# Patient Record
Sex: MALE | Race: BLACK OR AFRICAN AMERICAN | HISPANIC OR LATINO
[De-identification: names, ages, dates, MRNs, and addresses within clinical notes are randomized per-mention and may not be internally consistent; named-entity substitution may affect disease eponyms.]

---

## 2021-07-28 ENCOUNTER — APPOINTMENT (OUTPATIENT)
Dept: CT IMAGING | Facility: CLINIC | Age: 51
End: 2021-07-28
Payer: MEDICAID

## 2021-07-28 ENCOUNTER — OUTPATIENT (OUTPATIENT)
Dept: OUTPATIENT SERVICES | Facility: HOSPITAL | Age: 51
LOS: 1 days | End: 2021-07-28

## 2021-07-28 PROCEDURE — 75574 CT ANGIO HRT W/3D IMAGE: CPT | Mod: 26

## 2021-09-08 VITALS
RESPIRATION RATE: 20 BRPM | WEIGHT: 222.01 LBS | SYSTOLIC BLOOD PRESSURE: 96 MMHG | HEIGHT: 68 IN | DIASTOLIC BLOOD PRESSURE: 60 MMHG | HEART RATE: 63 BPM | OXYGEN SATURATION: 94 % | TEMPERATURE: 98 F

## 2021-09-08 RX ORDER — CHLORHEXIDINE GLUCONATE 213 G/1000ML
1 SOLUTION TOPICAL ONCE
Refills: 0 | Status: DISCONTINUED | OUTPATIENT
Start: 2021-09-10 | End: 2021-09-25

## 2021-09-08 NOTE — H&P ADULT - HISTORY OF PRESENT ILLNESS
[facility] CARDIOLOGY [facility]    Cardiologist: Dr. Maria D Chin   Pharmacy: [facility]  Escort:   COVID: (   )  on           , results:    *Confirm medications*    Pt is a 51 y/o M, with FHx of heart disease (father with CABG at 89), and  PMHx of AME, aortic stenosis, carotid bruit, intermittent asthma (hospitalized in past, denies intubations), who presented to his cardiologist, Dr. Maria D Chin with complaints of shortness of breath with exertion <6 blocks while walking, running, or riding a bike. Patient describes his SOB as if he is "suffocating." Patient audibly wheezing on exam in office. Patient denies chest pain, palpitations, dizziness, syncope, orthopnea, PND, LE Edema, peripheral edema, fever, chills, recent travel, or sick contacts. ECHO 07/17/2021: LA/RA is normal. Aortic valve sclerosis with mild calcification. Trace MR/TR. No pericardial effusion. EF: 70%. CCTA 08/09/2021: Calcium score is mild at 27 Agatson units, which is at the 78 percentile, adjusted for age, gender, and race. Less than 25% stenosis of the ostium of the LM due to calcific plaque. Normal LAD, LCx, and RCA. EF: 57%. Heart is enlarged. Aortic annular/valve calcifications. Trace calcific plaque aortic arch.     In light of patient's risk factors, CCS class III angina equivalent symptoms, and abnormal CCTA pt to undergo cardiac catheterization with possible intervention if clinically indicated.  COVID: negative 9/7 (in chart)  Pharmacy: [facility]    50Y M w/ FHx CABG and PMHx of AME, aortic stenosis, carotid bruit and asthma, initially presented to outpt cardiologist, Dr. Chin c/o STUBBS w/ walking 6 blocks, running or riding a bike. He denies any CP, palpitations, dizziness, syncope, diaphoresis, fatigue, LE edema, orthopnea, PND, N/V, fever, chills or recent sick contact. Echo 7/17/21: EF 70%, trace MR/TR. CCTA 8/9/21: Calcium score 27, <25% stenosis ostial LM due to calcific plaque, Normal LAD, LCx, and RCA, EF 57%, Heart is enlarged, trace calcific plaque aortic arch.   In light of pts risk factors, CCS class III anginal symptoms and abnormal CCTA, pt now presents to [facility] for recommended cardiac catheterization with possible intervention if clinically indicated.  COVID: negative 9/7 (in chart)  Pharmacy: [email or web address]  Escort: Family    50Y M w/ FHx CAD w/ CABG and PMHx of AME, carotid bruit and asthma, initially presented to outpt cardiologist, Dr. Chin c/o TSUBBS w/ walking 6 blocks, running or riding a bike. He denies any CP, palpitations, dizziness, syncope, diaphoresis, fatigue, LE edema, orthopnea, PND, N/V, fever, chills or recent sick contact. Echo 7/17/21: EF 70%, trace MR/TR. CCTA 8/9/21: Calcium score 27, <25% stenosis ostial LM due to calcific plaque, Normal LAD, LCx, and RCA, EF 57%, Heart is enlarged, trace calcific plaque aortic arch.   In light of pts risk factors, CCS class III anginal symptoms and abnormal CCTA, pt now presents to [facility] for recommended cardiac catheterization with possible intervention if clinically indicated.

## 2021-09-08 NOTE — H&P ADULT - NSHPLABSRESULTS_GEN_ALL_CORE
16.1   8.01  )-----------( 183      ( 10 Sep 2021 14:40 )             48.0     139  |  105  |  15  ----------------------------<  83  4.0   |  x   |  0.92    Ca    9.0      10 Sep 2021 14:40      PT/INR - ( 10 Sep 2021 14:40 )   PT: 12.1 sec;   INR: 1.01          PTT - ( 10 Sep 2021 14:40 )  PTT:28.4 sec

## 2021-09-08 NOTE — H&P ADULT - ASSESSMENT
50Y M w/ FHx CABG and PMHx of AME, aortic stenosis, carotid bruit and asthma, 50Y M, Pashto speaking, w/ FHx CAD w/ CABG and PMHx of AME, carotid bruit and asthma, presents for recommended cardiac catheterization w/ possible intervention if clinically indicated, in light of pts risk factors, CCS class III anginal symptoms and abnormal CCTA.    -ASA 3, Mallampati 3  -Pt compliant w/ ASA 81mg, last dose yesterday. Will give home dose of ASA 81mg and load w/ PLavix 600mg prior to cath  -BP 96/60, pt euvolemic w/ normal LVEF, will give 250cc NS Bolus prior to cath  -pre cath consented w/  language line  #[id number]    Risks & benefits of procedure and alternative therapy have been explained to the patient including but not limited to: allergic reaction, bleeding w/possible need for blood transfusion, infection, renal and vascular compromise, limb damage, arrhythmia, stroke, vessel dissection/perforation, Myocardial infarction, emergent CABG. Informed consent obtained and in chart.

## 2021-09-08 NOTE — H&P ADULT - NSICDXFAMILYHX_GEN_ALL_CORE_FT
FAMILY HISTORY:  Father  Still living? Unknown  Family history of CABG, Age at diagnosis: Age Unknown

## 2021-09-10 ENCOUNTER — OUTPATIENT (OUTPATIENT)
Dept: OUTPATIENT SERVICES | Facility: HOSPITAL | Age: 51
LOS: 1 days | Discharge: HOME | End: 2021-09-10
Payer: MEDICAID

## 2021-09-10 LAB
A1C WITH ESTIMATED AVERAGE GLUCOSE RESULT: 6 % — HIGH (ref 4–5.6)
ALBUMIN SERPL ELPH-MCNC: 4.3 G/DL — SIGNIFICANT CHANGE UP (ref 3.3–5)
ALP SERPL-CCNC: 55 U/L — SIGNIFICANT CHANGE UP (ref 40–120)
ALT FLD-CCNC: 24 U/L — SIGNIFICANT CHANGE UP (ref 10–45)
ANION GAP SERPL CALC-SCNC: 13 MMOL/L — SIGNIFICANT CHANGE UP (ref 5–17)
APTT BLD: 28.4 SEC — SIGNIFICANT CHANGE UP (ref 27.5–35.5)
AST SERPL-CCNC: 20 U/L — SIGNIFICANT CHANGE UP (ref 10–40)
BASOPHILS # BLD AUTO: 0.05 K/UL — SIGNIFICANT CHANGE UP (ref 0–0.2)
BASOPHILS NFR BLD AUTO: 0.6 % — SIGNIFICANT CHANGE UP (ref 0–2)
BILIRUB SERPL-MCNC: 0.4 MG/DL — SIGNIFICANT CHANGE UP (ref 0.2–1.2)
BUN SERPL-MCNC: 15 MG/DL — SIGNIFICANT CHANGE UP (ref 7–23)
CALCIUM SERPL-MCNC: 9 MG/DL — SIGNIFICANT CHANGE UP (ref 8.4–10.5)
CHLORIDE SERPL-SCNC: 105 MMOL/L — SIGNIFICANT CHANGE UP (ref 96–108)
CHOLEST SERPL-MCNC: 178 MG/DL — SIGNIFICANT CHANGE UP
CK MB CFR SERPL CALC: 1.4 NG/ML — SIGNIFICANT CHANGE UP (ref 0–6.7)
CK SERPL-CCNC: 61 U/L — SIGNIFICANT CHANGE UP (ref 30–200)
CO2 SERPL-SCNC: 21 MMOL/L — LOW (ref 22–31)
CREAT SERPL-MCNC: 0.92 MG/DL — SIGNIFICANT CHANGE UP (ref 0.5–1.3)
EOSINOPHIL # BLD AUTO: 0.46 K/UL — SIGNIFICANT CHANGE UP (ref 0–0.5)
EOSINOPHIL NFR BLD AUTO: 5.7 % — SIGNIFICANT CHANGE UP (ref 0–6)
ESTIMATED AVERAGE GLUCOSE: 126 MG/DL — HIGH (ref 68–114)
GLUCOSE SERPL-MCNC: 83 MG/DL — SIGNIFICANT CHANGE UP (ref 70–99)
HCT VFR BLD CALC: 48 % — SIGNIFICANT CHANGE UP (ref 39–50)
HDLC SERPL-MCNC: 50 MG/DL — SIGNIFICANT CHANGE UP
HGB BLD-MCNC: 16.1 G/DL — SIGNIFICANT CHANGE UP (ref 13–17)
IMM GRANULOCYTES NFR BLD AUTO: 0.2 % — SIGNIFICANT CHANGE UP (ref 0–1.5)
INR BLD: 1.01 — SIGNIFICANT CHANGE UP (ref 0.88–1.16)
LIPID PNL WITH DIRECT LDL SERPL: 111 MG/DL — HIGH
LYMPHOCYTES # BLD AUTO: 2.92 K/UL — SIGNIFICANT CHANGE UP (ref 1–3.3)
LYMPHOCYTES # BLD AUTO: 36.5 % — SIGNIFICANT CHANGE UP (ref 13–44)
MCHC RBC-ENTMCNC: 30.6 PG — SIGNIFICANT CHANGE UP (ref 27–34)
MCHC RBC-ENTMCNC: 33.5 GM/DL — SIGNIFICANT CHANGE UP (ref 32–36)
MCV RBC AUTO: 91.1 FL — SIGNIFICANT CHANGE UP (ref 80–100)
MONOCYTES # BLD AUTO: 0.96 K/UL — HIGH (ref 0–0.9)
MONOCYTES NFR BLD AUTO: 12 % — SIGNIFICANT CHANGE UP (ref 2–14)
NEUTROPHILS # BLD AUTO: 3.6 K/UL — SIGNIFICANT CHANGE UP (ref 1.8–7.4)
NEUTROPHILS NFR BLD AUTO: 45 % — SIGNIFICANT CHANGE UP (ref 43–77)
NON HDL CHOLESTEROL: 128 MG/DL — SIGNIFICANT CHANGE UP
NRBC # BLD: 0 /100 WBCS — SIGNIFICANT CHANGE UP (ref 0–0)
PLATELET # BLD AUTO: 183 K/UL — SIGNIFICANT CHANGE UP (ref 150–400)
POTASSIUM SERPL-MCNC: 4 MMOL/L — SIGNIFICANT CHANGE UP (ref 3.5–5.3)
POTASSIUM SERPL-SCNC: 4 MMOL/L — SIGNIFICANT CHANGE UP (ref 3.5–5.3)
PROT SERPL-MCNC: 8 G/DL — SIGNIFICANT CHANGE UP (ref 6–8.3)
PROTHROM AB SERPL-ACNC: 12.1 SEC — SIGNIFICANT CHANGE UP (ref 10.6–13.6)
RBC # BLD: 5.27 M/UL — SIGNIFICANT CHANGE UP (ref 4.2–5.8)
RBC # FLD: 14.3 % — SIGNIFICANT CHANGE UP (ref 10.3–14.5)
SODIUM SERPL-SCNC: 139 MMOL/L — SIGNIFICANT CHANGE UP (ref 135–145)
TRIGL SERPL-MCNC: 86 MG/DL — SIGNIFICANT CHANGE UP
WBC # BLD: 8.01 K/UL — SIGNIFICANT CHANGE UP (ref 3.8–10.5)
WBC # FLD AUTO: 8.01 K/UL — SIGNIFICANT CHANGE UP (ref 3.8–10.5)

## 2021-09-10 PROCEDURE — 85610 PROTHROMBIN TIME: CPT

## 2021-09-10 PROCEDURE — 80053 COMPREHEN METABOLIC PANEL: CPT

## 2021-09-10 PROCEDURE — 93458 L HRT ARTERY/VENTRICLE ANGIO: CPT

## 2021-09-10 PROCEDURE — 82553 CREATINE MB FRACTION: CPT

## 2021-09-10 PROCEDURE — 99152 MOD SED SAME PHYS/QHP 5/>YRS: CPT

## 2021-09-10 PROCEDURE — 83036 HEMOGLOBIN GLYCOSYLATED A1C: CPT

## 2021-09-10 PROCEDURE — 93010 ELECTROCARDIOGRAM REPORT: CPT

## 2021-09-10 PROCEDURE — 93005 ELECTROCARDIOGRAM TRACING: CPT

## 2021-09-10 PROCEDURE — C1769: CPT

## 2021-09-10 PROCEDURE — C1887: CPT

## 2021-09-10 PROCEDURE — 82550 ASSAY OF CK (CPK): CPT

## 2021-09-10 PROCEDURE — C1894: CPT

## 2021-09-10 PROCEDURE — 80061 LIPID PANEL: CPT

## 2021-09-10 PROCEDURE — 99153 MOD SED SAME PHYS/QHP EA: CPT

## 2021-09-10 PROCEDURE — 85025 COMPLETE CBC W/AUTO DIFF WBC: CPT

## 2021-09-10 PROCEDURE — 85730 THROMBOPLASTIN TIME PARTIAL: CPT

## 2021-09-10 RX ORDER — ALBUTEROL 90 UG/1
2 AEROSOL, METERED ORAL
Qty: 0 | Refills: 0 | DISCHARGE

## 2021-09-10 RX ORDER — ATORVASTATIN CALCIUM 80 MG/1
1 TABLET, FILM COATED ORAL
Qty: 0 | Refills: 0 | DISCHARGE

## 2021-09-10 RX ORDER — ASPIRIN/CALCIUM CARB/MAGNESIUM 324 MG
81 TABLET ORAL ONCE
Refills: 0 | Status: COMPLETED | OUTPATIENT
Start: 2021-09-10 | End: 2021-09-10

## 2021-09-10 RX ORDER — CLOPIDOGREL BISULFATE 75 MG/1
600 TABLET, FILM COATED ORAL ONCE
Refills: 0 | Status: COMPLETED | OUTPATIENT
Start: 2021-09-10 | End: 2021-09-10

## 2021-09-10 RX ORDER — SODIUM CHLORIDE 9 MG/ML
250 INJECTION INTRAMUSCULAR; INTRAVENOUS; SUBCUTANEOUS ONCE
Refills: 0 | Status: DISCONTINUED | OUTPATIENT
Start: 2021-09-10 | End: 2021-09-25

## 2021-09-10 RX ORDER — FLUTICASONE PROPIONATE AND SALMETEROL 50; 250 UG/1; UG/1
2 POWDER ORAL; RESPIRATORY (INHALATION)
Qty: 0 | Refills: 0 | DISCHARGE

## 2021-09-10 RX ORDER — ISOSORBIDE MONONITRATE 60 MG/1
1 TABLET, EXTENDED RELEASE ORAL
Qty: 0 | Refills: 0 | DISCHARGE

## 2021-09-10 RX ORDER — ASPIRIN/CALCIUM CARB/MAGNESIUM 324 MG
1 TABLET ORAL
Qty: 0 | Refills: 0 | DISCHARGE

## 2021-09-10 RX ADMIN — CLOPIDOGREL BISULFATE 600 MILLIGRAM(S): 75 TABLET, FILM COATED ORAL at 15:15

## 2021-09-10 RX ADMIN — Medication 81 MILLIGRAM(S): at 15:16

## 2021-09-10 NOTE — PROGRESS NOTE ADULT - SUBJECTIVE AND OBJECTIVE BOX
Interventional Cardiology PA SDA Discharge Note    Patient without complaints. Ambulated and voided without difficulties    Afebrile, VSS    Ext: Right Radial : No hematoma, no bleeding, dressing; C/D/I      Pulses:    intact RAD     A/P:  50Y M w/ FHx CAD w/ CABG and PMHx of AME, carotid bruit and asthma, initially presented to outpt cardiologist, Dr. Anthony c/o STUBBS w/ walking 6 blocks, running or riding a bike. He denies any CP, palpitations, dizziness, syncope, diaphoresis, fatigue, LE edema, orthopnea, PND, N/V, fever, chills or recent sick contact. Echo 7/17/21: EF 70%, trace MR/TR. CCTA 8/9/21: Calcium score 27, <25% stenosis ostial LM due to calcific plaque, Normal LAD, LCx, and RCA, EF 57%, Heart is enlarged, trace calcific plaque aortic arch.   In light of pts risk factors, CCS class III anginal symptoms and abnormal CCTA, pt now presents to [facility] for recommended cardiac catheterization with possible intervention if clinically indicated.     Patient is s/p cardiac cath 09/10/2021: normal.  EDP 22-24. EF normal as per echo. R TR due at 7:30 PM.     1.	Stable for discharge as per attending Dr. anthony after bed rest, pt voids,wrist stable and 30 minutes of ambulation.  2.	Follow-up with PMD/Cardiologist, Dr. Anthony in 1-2 weeks  3.	Discharged forms signed and copies in chart

## 2021-09-14 DIAGNOSIS — I25.118 ATHEROSCLEROTIC HEART DISEASE OF NATIVE CORONARY ARTERY WITH OTHER FORMS OF ANGINA PECTORIS: ICD-10-CM

## 2021-09-14 DIAGNOSIS — I25.84 CORONARY ATHEROSCLEROSIS DUE TO CALCIFIED CORONARY LESION: ICD-10-CM
